# Patient Record
Sex: FEMALE | Race: WHITE | NOT HISPANIC OR LATINO | Employment: OTHER | URBAN - METROPOLITAN AREA
[De-identification: names, ages, dates, MRNs, and addresses within clinical notes are randomized per-mention and may not be internally consistent; named-entity substitution may affect disease eponyms.]

---

## 2024-07-31 ENCOUNTER — APPOINTMENT (OUTPATIENT)
Dept: RADIOLOGY | Facility: HOSPITAL | Age: 64
End: 2024-07-31
Payer: COMMERCIAL

## 2024-07-31 ENCOUNTER — APPOINTMENT (OUTPATIENT)
Dept: CARDIOLOGY | Facility: HOSPITAL | Age: 64
End: 2024-07-31
Payer: COMMERCIAL

## 2024-07-31 ENCOUNTER — HOSPITAL ENCOUNTER (OUTPATIENT)
Facility: HOSPITAL | Age: 64
Setting detail: OBSERVATION
Discharge: HOME | End: 2024-08-02
Attending: INTERNAL MEDICINE | Admitting: INTERNAL MEDICINE
Payer: COMMERCIAL

## 2024-07-31 DIAGNOSIS — R94.31 ABNORMAL ELECTROCARDIOGRAM (ECG) (EKG): ICD-10-CM

## 2024-07-31 DIAGNOSIS — S93.401A SPRAIN OF RIGHT ANKLE, UNSPECIFIED LIGAMENT, INITIAL ENCOUNTER: ICD-10-CM

## 2024-07-31 DIAGNOSIS — R55 SYNCOPE, UNSPECIFIED SYNCOPE TYPE: Primary | ICD-10-CM

## 2024-07-31 DIAGNOSIS — R00.1 BRADYCARDIA, UNSPECIFIED: ICD-10-CM

## 2024-07-31 LAB
ALBUMIN SERPL BCP-MCNC: 4.1 G/DL (ref 3.4–5)
ALP SERPL-CCNC: 61 U/L (ref 33–136)
ALT SERPL W P-5'-P-CCNC: 8 U/L (ref 7–45)
ANION GAP SERPL CALC-SCNC: 16 MMOL/L (ref 10–20)
AST SERPL W P-5'-P-CCNC: 21 U/L (ref 9–39)
BASOPHILS # BLD AUTO: 0.1 X10*3/UL (ref 0–0.1)
BASOPHILS NFR BLD AUTO: 0.6 %
BILIRUB SERPL-MCNC: 0.3 MG/DL (ref 0–1.2)
BUN SERPL-MCNC: 25 MG/DL (ref 6–23)
CALCIUM SERPL-MCNC: 8.8 MG/DL (ref 8.6–10.3)
CARDIAC TROPONIN I PNL SERPL HS: 6 NG/L (ref 0–13)
CHLORIDE SERPL-SCNC: 100 MMOL/L (ref 98–107)
CO2 SERPL-SCNC: 21 MMOL/L (ref 21–32)
CREAT SERPL-MCNC: 1.02 MG/DL (ref 0.5–1.05)
EGFRCR SERPLBLD CKD-EPI 2021: 61 ML/MIN/1.73M*2
EOSINOPHIL # BLD AUTO: 0.19 X10*3/UL (ref 0–0.7)
EOSINOPHIL NFR BLD AUTO: 1.2 %
ERYTHROCYTE [DISTWIDTH] IN BLOOD BY AUTOMATED COUNT: 12.9 % (ref 11.5–14.5)
GLUCOSE SERPL-MCNC: 83 MG/DL (ref 74–99)
HCT VFR BLD AUTO: 41.7 % (ref 36–46)
HGB BLD-MCNC: 14.3 G/DL (ref 12–16)
HOLD SPECIMEN: NORMAL
IMM GRANULOCYTES # BLD AUTO: 0.04 X10*3/UL (ref 0–0.7)
IMM GRANULOCYTES NFR BLD AUTO: 0.3 % (ref 0–0.9)
LYMPHOCYTES # BLD AUTO: 7.52 X10*3/UL (ref 1.2–4.8)
LYMPHOCYTES NFR BLD AUTO: 48.6 %
MAGNESIUM SERPL-MCNC: 2.23 MG/DL (ref 1.6–2.4)
MCH RBC QN AUTO: 32.9 PG (ref 26–34)
MCHC RBC AUTO-ENTMCNC: 34.3 G/DL (ref 32–36)
MCV RBC AUTO: 96 FL (ref 80–100)
MONOCYTES # BLD AUTO: 0.99 X10*3/UL (ref 0.1–1)
MONOCYTES NFR BLD AUTO: 6.4 %
NEUTROPHILS # BLD AUTO: 6.64 X10*3/UL (ref 1.2–7.7)
NEUTROPHILS NFR BLD AUTO: 42.9 %
NRBC BLD-RTO: 0 /100 WBCS (ref 0–0)
PLATELET # BLD AUTO: 219 X10*3/UL (ref 150–450)
POTASSIUM SERPL-SCNC: 4.1 MMOL/L (ref 3.5–5.3)
PROT SERPL-MCNC: 6.7 G/DL (ref 6.4–8.2)
RBC # BLD AUTO: 4.35 X10*6/UL (ref 4–5.2)
SODIUM SERPL-SCNC: 133 MMOL/L (ref 136–145)
WBC # BLD AUTO: 15.5 X10*3/UL (ref 4.4–11.3)

## 2024-07-31 PROCEDURE — 70450 CT HEAD/BRAIN W/O DYE: CPT | Performed by: RADIOLOGY

## 2024-07-31 PROCEDURE — 72125 CT NECK SPINE W/O DYE: CPT

## 2024-07-31 PROCEDURE — 71045 X-RAY EXAM CHEST 1 VIEW: CPT | Mod: FOREIGN READ | Performed by: RADIOLOGY

## 2024-07-31 PROCEDURE — 86140 C-REACTIVE PROTEIN: CPT | Performed by: INTERNAL MEDICINE

## 2024-07-31 PROCEDURE — 73610 X-RAY EXAM OF ANKLE: CPT | Mod: RIGHT SIDE | Performed by: RADIOLOGY

## 2024-07-31 PROCEDURE — 84484 ASSAY OF TROPONIN QUANT: CPT | Performed by: STUDENT IN AN ORGANIZED HEALTH CARE EDUCATION/TRAINING PROGRAM

## 2024-07-31 PROCEDURE — 85025 COMPLETE CBC W/AUTO DIFF WBC: CPT | Performed by: STUDENT IN AN ORGANIZED HEALTH CARE EDUCATION/TRAINING PROGRAM

## 2024-07-31 PROCEDURE — 73610 X-RAY EXAM OF ANKLE: CPT | Mod: RT

## 2024-07-31 PROCEDURE — 2500000001 HC RX 250 WO HCPCS SELF ADMINISTERED DRUGS (ALT 637 FOR MEDICARE OP): Performed by: STUDENT IN AN ORGANIZED HEALTH CARE EDUCATION/TRAINING PROGRAM

## 2024-07-31 PROCEDURE — 73590 X-RAY EXAM OF LOWER LEG: CPT | Mod: LEFT SIDE | Performed by: RADIOLOGY

## 2024-07-31 PROCEDURE — 83735 ASSAY OF MAGNESIUM: CPT | Performed by: STUDENT IN AN ORGANIZED HEALTH CARE EDUCATION/TRAINING PROGRAM

## 2024-07-31 PROCEDURE — 71045 X-RAY EXAM CHEST 1 VIEW: CPT

## 2024-07-31 PROCEDURE — 80053 COMPREHEN METABOLIC PANEL: CPT | Performed by: STUDENT IN AN ORGANIZED HEALTH CARE EDUCATION/TRAINING PROGRAM

## 2024-07-31 PROCEDURE — 70450 CT HEAD/BRAIN W/O DYE: CPT

## 2024-07-31 PROCEDURE — 93005 ELECTROCARDIOGRAM TRACING: CPT

## 2024-07-31 PROCEDURE — 36415 COLL VENOUS BLD VENIPUNCTURE: CPT | Performed by: STUDENT IN AN ORGANIZED HEALTH CARE EDUCATION/TRAINING PROGRAM

## 2024-07-31 PROCEDURE — 73590 X-RAY EXAM OF LOWER LEG: CPT | Mod: LT

## 2024-07-31 PROCEDURE — 99285 EMERGENCY DEPT VISIT HI MDM: CPT

## 2024-07-31 PROCEDURE — 72125 CT NECK SPINE W/O DYE: CPT | Performed by: RADIOLOGY

## 2024-07-31 PROCEDURE — 82550 ASSAY OF CK (CPK): CPT | Performed by: INTERNAL MEDICINE

## 2024-07-31 RX ORDER — OXYCODONE HYDROCHLORIDE 5 MG/1
5 TABLET ORAL ONCE
Status: COMPLETED | OUTPATIENT
Start: 2024-07-31 | End: 2024-07-31

## 2024-07-31 ASSESSMENT — COLUMBIA-SUICIDE SEVERITY RATING SCALE - C-SSRS
1. IN THE PAST MONTH, HAVE YOU WISHED YOU WERE DEAD OR WISHED YOU COULD GO TO SLEEP AND NOT WAKE UP?: NO
2. HAVE YOU ACTUALLY HAD ANY THOUGHTS OF KILLING YOURSELF?: NO
6. HAVE YOU EVER DONE ANYTHING, STARTED TO DO ANYTHING, OR PREPARED TO DO ANYTHING TO END YOUR LIFE?: NO

## 2024-08-01 ENCOUNTER — APPOINTMENT (OUTPATIENT)
Dept: CARDIOLOGY | Facility: HOSPITAL | Age: 64
End: 2024-08-01
Payer: COMMERCIAL

## 2024-08-01 ENCOUNTER — APPOINTMENT (OUTPATIENT)
Dept: RADIOLOGY | Facility: HOSPITAL | Age: 64
End: 2024-08-01
Payer: COMMERCIAL

## 2024-08-01 PROBLEM — R55 SYNCOPE, UNSPECIFIED SYNCOPE TYPE: Status: ACTIVE | Noted: 2024-08-01

## 2024-08-01 LAB
ANION GAP SERPL CALC-SCNC: 11 MMOL/L (ref 10–20)
AORTIC VALVE MEAN GRADIENT: 9 MMHG
AORTIC VALVE PEAK VELOCITY: 2.09 M/S
ATRIAL RATE: 49 BPM
ATRIAL RATE: 50 BPM
AV PEAK GRADIENT: 17.5 MMHG
AVA (PEAK VEL): 2.34 CM2
AVA (VTI): 2.37 CM2
BUN SERPL-MCNC: 24 MG/DL (ref 6–23)
CALCIUM SERPL-MCNC: 8.6 MG/DL (ref 8.6–10.3)
CARDIAC TROPONIN I PNL SERPL HS: 5 NG/L (ref 0–13)
CHLORIDE SERPL-SCNC: 103 MMOL/L (ref 98–107)
CK SERPL-CCNC: 47 U/L (ref 0–215)
CO2 SERPL-SCNC: 27 MMOL/L (ref 21–32)
CREAT SERPL-MCNC: 0.81 MG/DL (ref 0.5–1.05)
CRP SERPL-MCNC: 0.23 MG/DL
EGFRCR SERPLBLD CKD-EPI 2021: 80 ML/MIN/1.73M*2
EJECTION FRACTION APICAL 4 CHAMBER: 60.8
EJECTION FRACTION: 67 %
ERYTHROCYTE [DISTWIDTH] IN BLOOD BY AUTOMATED COUNT: 13 % (ref 11.5–14.5)
GLUCOSE SERPL-MCNC: 95 MG/DL (ref 74–99)
HCT VFR BLD AUTO: 39.8 % (ref 36–46)
HGB BLD-MCNC: 13.3 G/DL (ref 12–16)
LEFT ATRIUM VOLUME AREA LENGTH INDEX BSA: 28.3 ML/M2
LEFT VENTRICLE INTERNAL DIMENSION DIASTOLE: 4.9 CM (ref 3.5–6)
LEFT VENTRICULAR OUTFLOW TRACT DIAMETER: 2.1 CM
MCH RBC QN AUTO: 32.2 PG (ref 26–34)
MCHC RBC AUTO-ENTMCNC: 33.4 G/DL (ref 32–36)
MCV RBC AUTO: 96 FL (ref 80–100)
MITRAL VALVE E/A RATIO: 1.06
NRBC BLD-RTO: 0 /100 WBCS (ref 0–0)
P AXIS: 69 DEGREES
P AXIS: 77 DEGREES
P OFFSET: 198 MS
P OFFSET: 200 MS
P ONSET: 143 MS
P ONSET: 143 MS
PLATELET # BLD AUTO: 206 X10*3/UL (ref 150–450)
POTASSIUM SERPL-SCNC: 3.1 MMOL/L (ref 3.5–5.3)
PR INTERVAL: 160 MS
PR INTERVAL: 160 MS
Q ONSET: 223 MS
Q ONSET: 223 MS
QRS COUNT: 8 BEATS
QRS COUNT: 8 BEATS
QRS DURATION: 86 MS
QRS DURATION: 90 MS
QT INTERVAL: 478 MS
QT INTERVAL: 502 MS
QTC CALCULATION(BAZETT): 431 MS
QTC CALCULATION(BAZETT): 457 MS
QTC FREDERICIA: 446 MS
QTC FREDERICIA: 472 MS
R AXIS: 80 DEGREES
R AXIS: 80 DEGREES
RBC # BLD AUTO: 4.13 X10*6/UL (ref 4–5.2)
RIGHT VENTRICLE FREE WALL PEAK S': 15.8 CM/S
RIGHT VENTRICLE PEAK SYSTOLIC PRESSURE: 16.5 MMHG
SODIUM SERPL-SCNC: 138 MMOL/L (ref 136–145)
T AXIS: 73 DEGREES
T AXIS: 73 DEGREES
T OFFSET: 462 MS
T OFFSET: 474 MS
TRICUSPID ANNULAR PLANE SYSTOLIC EXCURSION: 2.5 CM
VENTRICULAR RATE: 49 BPM
VENTRICULAR RATE: 50 BPM
WBC # BLD AUTO: 14 X10*3/UL (ref 4.4–11.3)

## 2024-08-01 PROCEDURE — 2500000004 HC RX 250 GENERAL PHARMACY W/ HCPCS (ALT 636 FOR OP/ED): Performed by: INTERNAL MEDICINE

## 2024-08-01 PROCEDURE — 36415 COLL VENOUS BLD VENIPUNCTURE: CPT | Performed by: INTERNAL MEDICINE

## 2024-08-01 PROCEDURE — G0378 HOSPITAL OBSERVATION PER HR: HCPCS

## 2024-08-01 PROCEDURE — 97165 OT EVAL LOW COMPLEX 30 MIN: CPT | Mod: GO

## 2024-08-01 PROCEDURE — 85027 COMPLETE CBC AUTOMATED: CPT | Performed by: INTERNAL MEDICINE

## 2024-08-01 PROCEDURE — 80048 BASIC METABOLIC PNL TOTAL CA: CPT | Performed by: INTERNAL MEDICINE

## 2024-08-01 PROCEDURE — 96374 THER/PROPH/DIAG INJ IV PUSH: CPT | Mod: 59

## 2024-08-01 PROCEDURE — 2500000001 HC RX 250 WO HCPCS SELF ADMINISTERED DRUGS (ALT 637 FOR MEDICARE OP): Performed by: INTERNAL MEDICINE

## 2024-08-01 PROCEDURE — 2500000001 HC RX 250 WO HCPCS SELF ADMINISTERED DRUGS (ALT 637 FOR MEDICARE OP): Performed by: STUDENT IN AN ORGANIZED HEALTH CARE EDUCATION/TRAINING PROGRAM

## 2024-08-01 PROCEDURE — 99223 1ST HOSP IP/OBS HIGH 75: CPT | Performed by: INTERNAL MEDICINE

## 2024-08-01 PROCEDURE — 93306 TTE W/DOPPLER COMPLETE: CPT | Performed by: INTERNAL MEDICINE

## 2024-08-01 PROCEDURE — 97161 PT EVAL LOW COMPLEX 20 MIN: CPT | Mod: GP

## 2024-08-01 PROCEDURE — 94640 AIRWAY INHALATION TREATMENT: CPT

## 2024-08-01 PROCEDURE — 93971 EXTREMITY STUDY: CPT

## 2024-08-01 PROCEDURE — 96372 THER/PROPH/DIAG INJ SC/IM: CPT | Mod: 59 | Performed by: INTERNAL MEDICINE

## 2024-08-01 PROCEDURE — C9113 INJ PANTOPRAZOLE SODIUM, VIA: HCPCS | Performed by: INTERNAL MEDICINE

## 2024-08-01 PROCEDURE — 93306 TTE W/DOPPLER COMPLETE: CPT

## 2024-08-01 PROCEDURE — 93971 EXTREMITY STUDY: CPT | Performed by: STUDENT IN AN ORGANIZED HEALTH CARE EDUCATION/TRAINING PROGRAM

## 2024-08-01 PROCEDURE — 2500000002 HC RX 250 W HCPCS SELF ADMINISTERED DRUGS (ALT 637 FOR MEDICARE OP, ALT 636 FOR OP/ED): Performed by: INTERNAL MEDICINE

## 2024-08-01 RX ORDER — ACETAMINOPHEN 325 MG/1
650 TABLET ORAL EVERY 4 HOURS PRN
Status: DISCONTINUED | OUTPATIENT
Start: 2024-08-01 | End: 2024-08-02 | Stop reason: HOSPADM

## 2024-08-01 RX ORDER — PREGABALIN 75 MG/1
150 CAPSULE ORAL 2 TIMES DAILY
Status: DISCONTINUED | OUTPATIENT
Start: 2024-08-01 | End: 2024-08-02 | Stop reason: HOSPADM

## 2024-08-01 RX ORDER — PANTOPRAZOLE SODIUM 40 MG/1
40 TABLET, DELAYED RELEASE ORAL DAILY
Status: DISCONTINUED | OUTPATIENT
Start: 2024-08-01 | End: 2024-08-02 | Stop reason: HOSPADM

## 2024-08-01 RX ORDER — OXYCODONE HYDROCHLORIDE 5 MG/1
5 TABLET ORAL EVERY 6 HOURS PRN
Status: DISCONTINUED | OUTPATIENT
Start: 2024-08-01 | End: 2024-08-02 | Stop reason: HOSPADM

## 2024-08-01 RX ORDER — ACETAMINOPHEN 650 MG/1
650 SUPPOSITORY RECTAL EVERY 4 HOURS PRN
Status: DISCONTINUED | OUTPATIENT
Start: 2024-08-01 | End: 2024-08-02 | Stop reason: HOSPADM

## 2024-08-01 RX ORDER — TIOTROPIUM BROMIDE 18 UG/1
18 CAPSULE ORAL; RESPIRATORY (INHALATION)
COMMUNITY

## 2024-08-01 RX ORDER — POTASSIUM CHLORIDE 20 MEQ/1
20 TABLET, EXTENDED RELEASE ORAL ONCE
Status: COMPLETED | OUTPATIENT
Start: 2024-08-01 | End: 2024-08-01

## 2024-08-01 RX ORDER — LOSARTAN POTASSIUM AND HYDROCHLOROTHIAZIDE 12.5; 5 MG/1; MG/1
1 TABLET ORAL DAILY
COMMUNITY
End: 2024-08-01 | Stop reason: WASHOUT

## 2024-08-01 RX ORDER — LAMOTRIGINE 100 MG/1
250 TABLET ORAL 2 TIMES DAILY
Status: DISCONTINUED | OUTPATIENT
Start: 2024-08-01 | End: 2024-08-02 | Stop reason: HOSPADM

## 2024-08-01 RX ORDER — LAMOTRIGINE 100 MG/1
250 TABLET ORAL 2 TIMES DAILY
Status: DISCONTINUED | OUTPATIENT
Start: 2024-08-01 | End: 2024-08-01

## 2024-08-01 RX ORDER — IPRATROPIUM BROMIDE AND ALBUTEROL SULFATE 2.5; .5 MG/3ML; MG/3ML
3 SOLUTION RESPIRATORY (INHALATION) EVERY 2 HOUR PRN
Status: DISCONTINUED | OUTPATIENT
Start: 2024-08-01 | End: 2024-08-02 | Stop reason: HOSPADM

## 2024-08-01 RX ORDER — ALBUTEROL SULFATE 90 UG/1
2 INHALANT RESPIRATORY (INHALATION) EVERY 4 HOURS PRN
COMMUNITY

## 2024-08-01 RX ORDER — ROSUVASTATIN CALCIUM 10 MG/1
20 TABLET, COATED ORAL DAILY
Status: DISCONTINUED | OUTPATIENT
Start: 2024-08-01 | End: 2024-08-02 | Stop reason: HOSPADM

## 2024-08-01 RX ORDER — FLUTICASONE FUROATE AND VILANTEROL 100; 25 UG/1; UG/1
1 POWDER RESPIRATORY (INHALATION)
Status: DISCONTINUED | OUTPATIENT
Start: 2024-08-01 | End: 2024-08-02 | Stop reason: HOSPADM

## 2024-08-01 RX ORDER — BUSPIRONE HYDROCHLORIDE 30 MG/1
30 TABLET ORAL 2 TIMES DAILY
COMMUNITY

## 2024-08-01 RX ORDER — ACETAMINOPHEN 160 MG/5ML
650 SOLUTION ORAL EVERY 4 HOURS PRN
Status: DISCONTINUED | OUTPATIENT
Start: 2024-08-01 | End: 2024-08-02 | Stop reason: HOSPADM

## 2024-08-01 RX ORDER — ENOXAPARIN SODIUM 100 MG/ML
40 INJECTION SUBCUTANEOUS EVERY 24 HOURS
Status: DISCONTINUED | OUTPATIENT
Start: 2024-08-01 | End: 2024-08-02 | Stop reason: HOSPADM

## 2024-08-01 RX ORDER — HYDROXYZINE PAMOATE 25 MG/1
25 CAPSULE ORAL 4 TIMES DAILY PRN
Status: DISCONTINUED | OUTPATIENT
Start: 2024-08-01 | End: 2024-08-02 | Stop reason: HOSPADM

## 2024-08-01 RX ORDER — CETIRIZINE HYDROCHLORIDE 10 MG/1
10 TABLET ORAL DAILY
Status: DISCONTINUED | OUTPATIENT
Start: 2024-08-01 | End: 2024-08-02 | Stop reason: HOSPADM

## 2024-08-01 RX ORDER — LAMOTRIGINE 200 MG/1
250 TABLET ORAL EVERY EVENING
COMMUNITY
End: 2024-08-01 | Stop reason: ENTERED-IN-ERROR

## 2024-08-01 RX ORDER — AMOXICILLIN 250 MG
2 CAPSULE ORAL 2 TIMES DAILY
Status: DISCONTINUED | OUTPATIENT
Start: 2024-08-01 | End: 2024-08-02 | Stop reason: HOSPADM

## 2024-08-01 RX ORDER — HYDROXYZINE HYDROCHLORIDE 25 MG/1
1 TABLET, FILM COATED ORAL 2 TIMES DAILY PRN
COMMUNITY

## 2024-08-01 RX ORDER — HYDROCHLOROTHIAZIDE 25 MG/1
25 TABLET ORAL DAILY
COMMUNITY

## 2024-08-01 RX ORDER — ZONISAMIDE 100 MG/1
300 CAPSULE ORAL NIGHTLY
COMMUNITY

## 2024-08-01 RX ORDER — LOSARTAN POTASSIUM 25 MG/1
25 TABLET ORAL DAILY
COMMUNITY

## 2024-08-01 RX ORDER — OXYCODONE HYDROCHLORIDE 5 MG/1
5 TABLET ORAL EVERY 8 HOURS PRN
Qty: 9 TABLET | Refills: 0 | Status: SHIPPED | OUTPATIENT
Start: 2024-08-01 | End: 2024-08-02

## 2024-08-01 RX ORDER — FLUOXETINE HYDROCHLORIDE 20 MG/1
40 CAPSULE ORAL DAILY
Status: DISCONTINUED | OUTPATIENT
Start: 2024-08-01 | End: 2024-08-02 | Stop reason: HOSPADM

## 2024-08-01 RX ORDER — IPRATROPIUM BROMIDE 17 UG/1
2 AEROSOL, METERED RESPIRATORY (INHALATION) 4 TIMES DAILY PRN
COMMUNITY
End: 2024-08-01 | Stop reason: ENTERED-IN-ERROR

## 2024-08-01 RX ORDER — PREGABALIN 75 MG/1
150 CAPSULE ORAL 2 TIMES DAILY
Status: DISCONTINUED | OUTPATIENT
Start: 2024-08-01 | End: 2024-08-01

## 2024-08-01 RX ORDER — LANOLIN ALCOHOL/MO/W.PET/CERES
1000 CREAM (GRAM) TOPICAL DAILY
COMMUNITY

## 2024-08-01 RX ORDER — LAMOTRIGINE 200 MG/1
250 TABLET ORAL EVERY MORNING
COMMUNITY
End: 2024-08-01 | Stop reason: ENTERED-IN-ERROR

## 2024-08-01 RX ORDER — PANTOPRAZOLE SODIUM 40 MG/10ML
40 INJECTION, POWDER, LYOPHILIZED, FOR SOLUTION INTRAVENOUS DAILY
Status: DISCONTINUED | OUTPATIENT
Start: 2024-08-01 | End: 2024-08-02 | Stop reason: HOSPADM

## 2024-08-01 RX ORDER — MORPHINE SULFATE 4 MG/ML
4 INJECTION, SOLUTION INTRAMUSCULAR; INTRAVENOUS EVERY 4 HOURS PRN
Status: DISCONTINUED | OUTPATIENT
Start: 2024-08-01 | End: 2024-08-02 | Stop reason: HOSPADM

## 2024-08-01 RX ORDER — FLUTICASONE PROPIONATE 50 MCG
1 SPRAY, SUSPENSION (ML) NASAL DAILY
Status: DISCONTINUED | OUTPATIENT
Start: 2024-08-01 | End: 2024-08-02 | Stop reason: HOSPADM

## 2024-08-01 RX ORDER — PREGABALIN 150 MG/1
150 CAPSULE ORAL EVERY MORNING
COMMUNITY

## 2024-08-01 RX ORDER — CALCIUM CARBONATE/VITAMIN D3 250-3.125
1 TABLET ORAL
COMMUNITY

## 2024-08-01 RX ORDER — FERROUS SULFATE, DRIED 160(50) MG
0.5 TABLET, EXTENDED RELEASE ORAL
Status: DISCONTINUED | OUTPATIENT
Start: 2024-08-01 | End: 2024-08-02 | Stop reason: HOSPADM

## 2024-08-01 RX ORDER — ROSUVASTATIN CALCIUM 20 MG/1
20 TABLET, COATED ORAL DAILY
COMMUNITY

## 2024-08-01 RX ORDER — PREGABALIN 75 MG/1
150 CAPSULE ORAL EVERY EVENING
Status: DISCONTINUED | OUTPATIENT
Start: 2024-08-01 | End: 2024-08-01

## 2024-08-01 RX ORDER — ZONISAMIDE 100 MG/1
300 CAPSULE ORAL NIGHTLY
Status: DISCONTINUED | OUTPATIENT
Start: 2024-08-01 | End: 2024-08-02 | Stop reason: HOSPADM

## 2024-08-01 RX ORDER — LORATADINE 10 MG/1
10 TABLET ORAL DAILY
COMMUNITY

## 2024-08-01 RX ORDER — IPRATROPIUM BROMIDE AND ALBUTEROL SULFATE 2.5; .5 MG/3ML; MG/3ML
3 SOLUTION RESPIRATORY (INHALATION) EVERY 4 HOURS PRN
Status: DISCONTINUED | OUTPATIENT
Start: 2024-08-01 | End: 2024-08-01

## 2024-08-01 RX ORDER — BUSPIRONE HYDROCHLORIDE 10 MG/1
30 TABLET ORAL 2 TIMES DAILY
Status: DISCONTINUED | OUTPATIENT
Start: 2024-08-01 | End: 2024-08-02 | Stop reason: HOSPADM

## 2024-08-01 RX ORDER — HYDROXYZINE PAMOATE 25 MG/1
25 CAPSULE ORAL 4 TIMES DAILY PRN
COMMUNITY
End: 2024-08-01 | Stop reason: ENTERED-IN-ERROR

## 2024-08-01 RX ORDER — OMEPRAZOLE 20 MG/1
20 CAPSULE, DELAYED RELEASE ORAL
COMMUNITY

## 2024-08-01 RX ORDER — ASPIRIN 81 MG/1
81 TABLET ORAL DAILY
COMMUNITY

## 2024-08-01 RX ORDER — FLUTICASONE PROPIONATE 50 MCG
1 SPRAY, SUSPENSION (ML) NASAL DAILY
COMMUNITY
End: 2024-08-01 | Stop reason: ENTERED-IN-ERROR

## 2024-08-01 RX ORDER — FERROUS SULFATE 325(65) MG
65 TABLET, DELAYED RELEASE (ENTERIC COATED) ORAL
COMMUNITY

## 2024-08-01 RX ORDER — DICLOFENAC SODIUM 10 MG/G
4 GEL TOPICAL 4 TIMES DAILY PRN
COMMUNITY
End: 2024-08-01 | Stop reason: ENTERED-IN-ERROR

## 2024-08-01 RX ORDER — LAMOTRIGINE 100 MG/1
250 TABLET ORAL EVERY MORNING
Status: DISCONTINUED | OUTPATIENT
Start: 2024-08-01 | End: 2024-08-01

## 2024-08-01 RX ORDER — BUDESONIDE AND FORMOTEROL FUMARATE DIHYDRATE 80; 4.5 UG/1; UG/1
2 AEROSOL RESPIRATORY (INHALATION)
COMMUNITY

## 2024-08-01 RX ORDER — LOSARTAN POTASSIUM 25 MG/1
25 TABLET ORAL DAILY
Status: DISCONTINUED | OUTPATIENT
Start: 2024-08-01 | End: 2024-08-02 | Stop reason: HOSPADM

## 2024-08-01 RX ORDER — FLUOXETINE HYDROCHLORIDE 20 MG/1
40 CAPSULE ORAL DAILY
COMMUNITY

## 2024-08-01 RX ORDER — PREGABALIN 150 MG/1
300 CAPSULE ORAL EVERY EVENING
COMMUNITY

## 2024-08-01 RX ORDER — LAMOTRIGINE 100 MG/1
250 TABLET ORAL 2 TIMES DAILY
COMMUNITY

## 2024-08-01 RX ORDER — HYDROCHLOROTHIAZIDE 25 MG/1
25 TABLET ORAL DAILY
Status: DISCONTINUED | OUTPATIENT
Start: 2024-08-01 | End: 2024-08-02 | Stop reason: HOSPADM

## 2024-08-01 RX ORDER — LANOLIN ALCOHOL/MO/W.PET/CERES
1000 CREAM (GRAM) TOPICAL DAILY
Status: DISCONTINUED | OUTPATIENT
Start: 2024-08-01 | End: 2024-08-02 | Stop reason: HOSPADM

## 2024-08-01 SDOH — SOCIAL STABILITY: SOCIAL INSECURITY: ARE THERE ANY APPARENT SIGNS OF INJURIES/BEHAVIORS THAT COULD BE RELATED TO ABUSE/NEGLECT?: NO

## 2024-08-01 SDOH — SOCIAL STABILITY: SOCIAL INSECURITY: DO YOU FEEL ANYONE HAS EXPLOITED OR TAKEN ADVANTAGE OF YOU FINANCIALLY OR OF YOUR PERSONAL PROPERTY?: NO

## 2024-08-01 SDOH — SOCIAL STABILITY: SOCIAL INSECURITY: WERE YOU ABLE TO COMPLETE ALL THE BEHAVIORAL HEALTH SCREENINGS?: YES

## 2024-08-01 SDOH — SOCIAL STABILITY: SOCIAL INSECURITY: DO YOU FEEL UNSAFE GOING BACK TO THE PLACE WHERE YOU ARE LIVING?: NO

## 2024-08-01 SDOH — SOCIAL STABILITY: SOCIAL INSECURITY: ABUSE: ADULT

## 2024-08-01 SDOH — SOCIAL STABILITY: SOCIAL INSECURITY: ARE YOU OR HAVE YOU BEEN THREATENED OR ABUSED PHYSICALLY, EMOTIONALLY, OR SEXUALLY BY ANYONE?: NO

## 2024-08-01 SDOH — SOCIAL STABILITY: SOCIAL INSECURITY: HAVE YOU HAD THOUGHTS OF HARMING ANYONE ELSE?: NO

## 2024-08-01 SDOH — SOCIAL STABILITY: SOCIAL INSECURITY: HAS ANYONE EVER THREATENED TO HURT YOUR FAMILY OR YOUR PETS?: NO

## 2024-08-01 SDOH — SOCIAL STABILITY: SOCIAL INSECURITY: HAVE YOU HAD ANY THOUGHTS OF HARMING ANYONE ELSE?: NO

## 2024-08-01 SDOH — SOCIAL STABILITY: SOCIAL INSECURITY: DOES ANYONE TRY TO KEEP YOU FROM HAVING/CONTACTING OTHER FRIENDS OR DOING THINGS OUTSIDE YOUR HOME?: NO

## 2024-08-01 ASSESSMENT — ACTIVITIES OF DAILY LIVING (ADL)
DRESSING YOURSELF: INDEPENDENT
LACK_OF_TRANSPORTATION: NO
WALKS IN HOME: INDEPENDENT
GROOMING: INDEPENDENT
ADEQUATE_TO_COMPLETE_ADL: YES
BATHING: INDEPENDENT
HEARING - RIGHT EAR: FUNCTIONAL
TOILETING: INDEPENDENT
FEEDING YOURSELF: INDEPENDENT
PATIENT'S MEMORY ADEQUATE TO SAFELY COMPLETE DAILY ACTIVITIES?: YES
JUDGMENT_ADEQUATE_SAFELY_COMPLETE_DAILY_ACTIVITIES: YES
HEARING - LEFT EAR: FUNCTIONAL
ASSISTIVE_DEVICE: EYEGLASSES

## 2024-08-01 ASSESSMENT — COGNITIVE AND FUNCTIONAL STATUS - GENERAL
WALKING IN HOSPITAL ROOM: A LOT
WALKING IN HOSPITAL ROOM: A LITTLE
MOBILITY SCORE: 14
DAILY ACTIVITIY SCORE: 22
CLIMB 3 TO 5 STEPS WITH RAILING: A LOT
DRESSING REGULAR LOWER BODY CLOTHING: A LITTLE
MOVING TO AND FROM BED TO CHAIR: A LITTLE
DRESSING REGULAR LOWER BODY CLOTHING: TOTAL
MOVING TO AND FROM BED TO CHAIR: A LOT
TOILETING: A LOT
STANDING UP FROM CHAIR USING ARMS: A LITTLE
TOILETING: A LITTLE
CLIMB 3 TO 5 STEPS WITH RAILING: A LOT
STANDING UP FROM CHAIR USING ARMS: A LOT
PATIENT BASELINE BEDBOUND: NO
TURNING FROM BACK TO SIDE WHILE IN FLAT BAD: A LITTLE
DAILY ACTIVITIY SCORE: 15
HELP NEEDED FOR BATHING: TOTAL
DRESSING REGULAR UPPER BODY CLOTHING: A LITTLE
MOBILITY SCORE: 19
MOVING FROM LYING ON BACK TO SITTING ON SIDE OF FLAT BED WITH BEDRAILS: A LITTLE

## 2024-08-01 ASSESSMENT — LIFESTYLE VARIABLES
HOW MANY STANDARD DRINKS CONTAINING ALCOHOL DO YOU HAVE ON A TYPICAL DAY: 1 OR 2
SKIP TO QUESTIONS 9-10: 1
HOW OFTEN DO YOU HAVE 6 OR MORE DRINKS ON ONE OCCASION: NEVER
HOW OFTEN DO YOU HAVE A DRINK CONTAINING ALCOHOL: MONTHLY OR LESS
AUDIT-C TOTAL SCORE: 1
AUDIT-C TOTAL SCORE: 1

## 2024-08-01 ASSESSMENT — PAIN SCALES - GENERAL
PAINLEVEL_OUTOF10: 9
PAINLEVEL_OUTOF10: 7

## 2024-08-01 ASSESSMENT — PATIENT HEALTH QUESTIONNAIRE - PHQ9
2. FEELING DOWN, DEPRESSED OR HOPELESS: NOT AT ALL
1. LITTLE INTEREST OR PLEASURE IN DOING THINGS: NOT AT ALL
SUM OF ALL RESPONSES TO PHQ9 QUESTIONS 1 & 2: 0

## 2024-08-01 ASSESSMENT — PAIN DESCRIPTION - ORIENTATION
ORIENTATION: RIGHT;LEFT
ORIENTATION: RIGHT;LEFT

## 2024-08-01 ASSESSMENT — PAIN DESCRIPTION - LOCATION
LOCATION: ANKLE
LOCATION: ANKLE

## 2024-08-01 NOTE — PROGRESS NOTES
Physical Therapy    Physical Therapy Evaluation    Patient Name: Elana Farr  MRN: [id number]  Today's Date: 8/1/2024   Time Calculation  Start Time: 1040  Stop Time: 1056  Time Calculation (min): 16 min  AC23/AC23    Assessment/Plan   PT Assessment  PT Assessment Results: Decreased endurance, Impaired balance, Decreased mobility, Pain  End of Session Communication: Bedside nurse  End of Session Patient Position: Bed, 2 rail up, On cart, Alarm off, not on at start of session (All needs in reach, RN present)  IP OR SWING BED PT PLAN  Inpatient or Swing Bed: Inpatient  PT Plan  Treatment/Interventions: Bed mobility, Transfer training, Gait training  PT Plan: Ongoing PT  PT Frequency: 3 times per week  PT Discharge Recommendations: Moderate intensity level of continued care  PT - OK to Discharge: Yes    Subjective     Current Problem:  1. Syncope, unspecified syncope type  Transthoracic Echo (TTE) Complete    Transthoracic Echo (TTE) Complete      2. Sprain of right ankle, unspecified ligament, initial encounter  oxyCODONE (Roxicodone) 5 mg immediate release tablet      3. Abnormal electrocardiogram (ECG) (EKG)  Transthoracic Echo (TTE) Complete      4. Bradycardia, unspecified  Transthoracic Echo (TTE) Complete        Patient Active Problem List   Diagnosis    Syncope, unspecified syncope type     General Visit Information:  General  Reason for Referral: PT Eval and Treat  Referred By: Darion Daniel DO  Past Medical History Relevant to Rehab: 64 y.o. female with past medical history of hypertension, hyperlipidemia moderate recurrent major depression disorder, anxiety, obstructive sleep apnea, asthma, COPD, seizure disorder, who is presenting to [facility] ED with syncopal episode x 1 at home.  Patient states she was in her backyard feeding birds, unsure how long she been out there but denies being under the sun, and after getting up and heading back to the house, she experienced episode of  "dizziness, and shortly after blacked out.  States that her partner witnessed the event, and saw her falling to her right side with her head protecting her head, and denied any head impact.  Stated that she was not able to get up helped to stand up she was not able to bear weight on her feet, with pain on lower extremity bilaterally but worse on the left foot, with shooting pain on the lateral aspect of the foot radiating up to the leg.  Patient has a history of seizures, but denies going through seizure episode, or having any other symptoms at this time.  Prior to Session Communication: Bedside nurse  Patient Position Received: Bed, 2 rail up, On cart, Alarm off, not on at start of session (Pt on ED cart, agreeable to PT)    Home Living:  Home Living  Home Living Comments: Pt lives with her partner in a 2 story house with 4 TYLER with HR and 1st floor set up with laundry in the basement with HR. Bathroom has a tub/shower with a transfer bench, grab bar, and HHSH, and a standard toilet. (Pt's partner has MS.)    Prior Level of Function:  Prior Function Per Pt/Caregiver Report  Level of [place]: Independent with ADLs and functional transfers, Independent with homemaking with ambulation (Pt amb without an AD, owns rollator, SPC, and possibly a RW, shares IADLs with partner, (-) driving, partner drives)    Precautions:  Precautions  Precautions Comment: Fall precautions, R ankle aircast    Vital Signs:  Vital Signs  BP:  (VSS)    Objective     Pain:  Pain Assessment  Pain Assessment:  (Pt reports tolerable pain at rest, however states B LE pain is \"25/10\" with activity. Pt states the L ankle pain radiates up through her calf and the R ankle is more localized pain.)    Cognition:  Cognition  Overall Cognitive Status: Within Functional Limits    General Assessments:  Sensation  Light Touch: No apparent deficits  Strength  Strength Comments: B LE ROM WFL, B LE strength at least 3+/5  Dynamic Standing Balance  Dynamic " Standing-Comments: Poor with use of RW and mod A x 2    Functional Assessments:  Bed Mobility  Bed Mobility:  (supine to sitting: min A x 1, sit to supine: mod A x 2 due to B LE pain)  Transfers  Transfer:  (STS from ED cart: min A x 2 with B LE instability noted)  Ambulation/Gait Training  Ambulation/Gait Training Performed:  (Pt was only able to side step x 2' using RW with mod A x 2 requiring increased B UE support through RW, increased difficulty WBing and advancing B LEs, and pt becoming lightheaded as well. Pt returned to sitting and then supine with VSS.)    Outcome Measures:   [facility] Basic Mobility  Turning from your back to your side while in a flat bed without using bedrails: A little  Moving from lying on your back to sitting on the side of a flat bed without using bedrails: A little  Moving to and from bed to chair (including a wheelchair): A lot  Standing up from a chair using your arms (e.g. wheelchair or bedside chair): A lot  To walk in hospital room: A lot  Climbing 3-5 steps with railing: A lot  Basic Mobility - Total Score: 14     Goals:  Encounter Problems       Encounter Problems (Active)       PT Problem       STG - Pt will transition supine <> sitting with SUP  (Progressing)       Start:  08/01/24    Expected End:  08/16/24            STG - Pt will transfer STS with SBA  (Progressing)       Start:  08/01/24    Expected End:  08/16/24            STG - Pt will amb 50' using LRD with SBA  (Progressing)       Start:  08/01/24    Expected End:  08/16/24                 Education Documentation  Precautions, taught by Fabienne Gallegos PT at 8/1/2024 12:25 PM.  Learner: Patient  Readiness: Acceptance  Method: Explanation  Response: Verbalizes Understanding    Mobility Training, taught by Fabienne Gallegos PT at 8/1/2024 12:25 PM.  Learner: Patient  Readiness: Acceptance  Method: Explanation  Response: Verbalizes Understanding    Education Comments  No comments found.

## 2024-08-01 NOTE — ED PROVIDER NOTES
EMERGENCY DEPARTMENT ENCOUNTER      Pt Name: Elana Farr  MRN: [id number]  Birthdate 1960  Date of evaluation: 7/31/2024  Provider: Santo Lee DO    CHIEF COMPLAINT       Chief Complaint   Patient presents with    Syncope       HISTORY OF PRESENT ILLNESS    Elana Farr is a 64 y.o. female who presents to the emergency department with EMS for syncopal episode.  Patient states that she was sitting for prolonged period of time outdoors in the heat.  She went to stand up began walking and felt as if she was going to pass out subsequently syncopized.  Her roommate was with her reported that she was out for approximately 30 seconds there was no reported seizure-like activity.  She immediately came to.  Her only complaints at this time are bilateral ankle pain for which she fell on them.  She denies any close head injury she landed on her forearm protecting her head.  She currently feels well asymptomatic.  She has been in her usual state of health prior to the incident as well.          Nursing Notes were reviewed.    REVIEW OF SYSTEMS     CONSTITUTIONAL: Denies fever, sweats, chills.   NEURO: Denies difficulty walking, numbness, weakness, tingling, headache.   HEENT: Denies sore throat, rhinorrhea, changes in vision.   CARDIO: Endorses syncope.  Denies chest pain, palpitations.  PULM: Denies shortness of breath, cough.   GI: Denies abdominal pain, nausea, vomiting, diarrhea, constipation, melena, hematochezia.  : Denies painful urination, frequency, hematuria.   MSK: Endorses ankle pain.  SKIN: Denies rash, lesions.   ENDOCRINE: Denies unexpected weight-loss.   HEME: Denies bleeding disorder.     PAST MEDICAL HISTORY   No past medical history on file.  CAD, emphysema, hypertension  SURGICAL HISTORY     No past surgical history on file.    ALLERGIES     Patient has no known allergies.    FAMILY HISTORY     No family history on file.     SOCIAL HISTORY       Social History     Socioeconomic History     Marital status:      Social Determinants of Health     Financial Resource Strain: Low Risk  (5/8/2023)    Received from [employer]    Overall Financial Resource Strain (CARDIA)     Difficulty of Paying Living Expenses: Not very hard   Food Insecurity: No Food Insecurity (5/8/2023)    Received from [employer]    Hunger Vital Sign     Worried About Running Out of Food in the Last Year: Never true     Ran Out of Food in the Last Year: Never true   Transportation Needs: Unmet Transportation Needs (5/8/2023)    Received from [employer]    PRAPARE - Transportation     Lack of Transportation (Medical): No     Lack of Transportation (Non-Medical): Yes   Physical Activity: Inactive (5/8/2023)    Received from [employer]    Exercise Vital Sign     Days of Exercise per Week: 0 days     Minutes of Exercise per Session: 0 min   Stress: Stress Concern Present (5/8/2023)    Received from [employer]    [employer] [place] [employer] - Occupational Stress Questionnaire     Feeling of Stress : Very much   Social Connections: Unknown (5/8/2023)    Received from [employer]    Social Connection and Isolation Panel [NHANES]     Frequency of Communication with Friends and Family: Once a week     Frequency of Social Gatherings with Friends and Family: Patient declined     Attends Faith Services: Never     Active Member of Clubs or Organizations: No     Attends Club or Organization Meetings: Never     Marital Status:    Intimate Partner Violence: Not At Risk (5/8/2023)    Received from [employer]    Humiliation, Afraid, Rape, and Kick questionnaire     Fear of Current or Ex-Partner: No     Emotionally Abused: No     Physically Abused: No     Sexually Abused: No       PHYSICAL EXAM   VS: As documented in the triage note from today's date and EMR flowsheet were reviewed.  Gen: Well developed. No acute distress. Seated in bed. Appears nontoxic.  GCS 15  Skin: Warm. Dry. Intact. No rashes or lesions.  Eyes:  Pupils equally round and reactive to light. Clear sclera. EOMI.  HENT: Atraumatic appearance. Mucosal membranes moist. No oral lesions, uvula midline, airway patent.  TMs collaterally nares Ayaz no midline cervical tenderness or step-offs no evidence of basilar skull fracture trachea is midline.  CV: Regular rate and regular rhythm. S1, S2. No pedal edema. Warm extremities.  Resp: Nonlabored breathing Clear to auscultation bilaterally. No increased work of breathing.   GI: Soft and nontender. No rebound or guarding. Bowel sounds x4 present.  Cruz sign McBurney's point tenderness is negative no CVA tenderness.  MSK: Symmetric muscle bulk. No joint swelling in the extremities. Compartments are soft. Neurovascularly intact x4 extremities. Radial pulses +2 equal bilaterally.  Pedal pulses +2 equal bilaterally pelvis is stable no T or L-spine tenderness or step-offs.  There is point tenderness over the right lateral malleolus as well as pain at the distal portion of the fibula tibia left-sided Achilles intact bilaterally as well as no fibular head tenderness no base of the fifth metatarsal tenderness no navicular pain.  Neuro: Alert. CN II - XII intact. Speech fluent. Moving all extremities. No focal deficits. Gait normal.  NIH 0  Psych: Appropriate. [place].    DIAGNOSTIC RESULTS   RADIOLOGY:   Non-plain film images such as CT, Ultrasound and MRI are read by the radiologist. Plain radiographic images are visualized and preliminarily interpreted by the emergency physician with the below findings: X-ray imaging of the chest shows no evidence of widened mediastinum or cardiomegaly.      Interpretation per the Radiologist below, if available at the time of this note:    XR chest 1 view   Final Result   No acute pulmonary abnormality.   Signed by Darrian Delacruz MD      XR ankle right 3+ views   Final Result   No acute fracture.   Signed by Darrian Delacruz MD      XR tibia fibula left 2 views   Final Result   No fracture.    Signed by Darrian Delacruz MD      CT head wo IV contrast   Final Result   1. No acute intracranial hemorrhage or mass effect.   2. No acute fracture or traumatic malalignment of the cervical spine.        Signed by: Blayne Mueller 7/31/2024 10:45 PM   Dictation workstation:   [id number]      CT cervical spine wo IV contrast   Final Result   1. No acute intracranial hemorrhage or mass effect.   2. No acute fracture or traumatic malalignment of the cervical spine.        Signed by: Blayne Mueller 7/31/2024 10:45 PM   Dictation workstation:   [id number]            ED BEDSIDE ULTRASOUND:   Performed by ED Physician - none    LABS:  Labs Reviewed   CBC WITH AUTO DIFFERENTIAL - Abnormal       Result Value    WBC 15.5 (*)     nRBC 0.0      RBC 4.35      Hemoglobin 14.3      Hematocrit 41.7      MCV 96      MCH 32.9      MCHC 34.3      RDW 12.9      Platelets 219      Neutrophils % 42.9      Immature Granulocytes %, Automated 0.3      Lymphocytes % 48.6      Monocytes % 6.4      Eosinophils % 1.2      Basophils % 0.6      Neutrophils Absolute 6.64      Immature Granulocytes Absolute, Automated 0.04      Lymphocytes Absolute 7.52 (*)     Monocytes Absolute 0.99      Eosinophils Absolute 0.19      Basophils Absolute 0.10     COMPREHENSIVE METABOLIC PANEL - Abnormal    Glucose 83      Sodium 133 (*)     Potassium 4.1      Chloride 100      Bicarbonate 21      Anion Gap 16      Urea Nitrogen 25 (*)     Creatinine 1.02      eGFR 61      Calcium 8.8      Albumin 4.1      Alkaline Phosphatase 61      Total Protein 6.7      AST 21      Bilirubin, Total 0.3      ALT 8     CBC - Abnormal    WBC 14.0 (*)     nRBC 0.0      RBC 4.13      Hemoglobin 13.3      Hematocrit 39.8      MCV 96      MCH 32.2      MCHC 33.4      RDW 13.0      Platelets 206     BASIC METABOLIC PANEL - Abnormal    Glucose 95      Sodium 138      Potassium 3.1 (*)     Chloride 103      Bicarbonate 27      Anion Gap 11      Urea Nitrogen 24 (*)      Creatinine 0.81      eGFR 80      Calcium 8.6     MAGNESIUM - Normal    Magnesium 2.23     SERIAL TROPONIN-INITIAL - Normal    Troponin I, High Sensitivity 6      Narrative:     Less than 99th percentile of normal range cutoff-  Female and children under 18 years old <14 ng/L; Male <21 ng/L: Negative  Repeat testing should be performed if clinically indicated.     Female and children under 18 years old 14-50 ng/L; Male 21-50 ng/L:  Consistent with possible cardiac damage and possible increased clinical   risk. Serial measurements may help to assess extent of myocardial damage.     >50 ng/L: Consistent with cardiac damage, increased clinical risk and  myocardial infarction. Serial measurements may help assess extent of   myocardial damage.      NOTE: Children less than 1 year old may have higher baseline troponin   levels and results should be interpreted in conjunction with the overall   clinical context.     NOTE: Troponin I testing is performed using a different   testing methodology at [facility] than at other   [facility]. Direct result comparisons should only   be made within the same method.   SERIAL TROPONIN, 1 HOUR - Normal    Troponin I, High Sensitivity 5      Narrative:     Less than 99th percentile of normal range cutoff-  Female and children under 18 years old <14 ng/L; Male <21 ng/L: Negative  Repeat testing should be performed if clinically indicated.     Female and children under 18 years old 14-50 ng/L; Male 21-50 ng/L:  Consistent with possible cardiac damage and possible increased clinical   risk. Serial measurements may help to assess extent of myocardial damage.     >50 ng/L: Consistent with cardiac damage, increased clinical risk and  myocardial infarction. Serial measurements may help assess extent of   myocardial damage.      NOTE: Children less than 1 year old may have higher baseline troponin   levels and results should be interpreted in conjunction with the overall  "  clinical context.     NOTE: Troponin I testing is performed using a different   testing methodology at [facility] than at other   [facility]. Direct result comparisons should only   be made within the same method.   CREATINE KINASE - Normal    Creatine Kinase 47     C-REACTIVE PROTEIN - Normal    C-Reactive Protein 0.23     TROPONIN SERIES- (INITIAL, 1 HR)    Narrative:     The following orders were created for panel order Troponin I Series, High Sensitivity (0, 1 HR).  Procedure                               Abnormality         Status                     ---------                               -----------         ------                     Troponin I, High Sensiti...[[id number]]  Normal              Final result               Troponin, High Sensitivi...[[id number]]  Normal              Final result                 Please view results for these tests on the individual orders.       All other labs were within normal range or not returned as of this dictation.    EMERGENCY DEPARTMENT COURSE/MDM:   Vitals:    Vitals:    07/31/24 2134 08/01/24 0059 08/01/24 0545   BP: 107/53 104/51 137/62   BP Location: Right arm Right arm Right arm   Patient Position: Lying Sitting Sitting   Pulse: (!) 48 52 52   Resp: 18 18 18   Temp: 36.4 °C (97.5 °F)     TempSrc: Temporal     SpO2: 95% 97% 95%   Weight: 105 kg (232 lb)     Height: 1.676 m (5' 6\")         I reviewed the patient's triage vitals and they are slightly bradycardic otherwise within normal range no evidence of block.    Due to the above findings the following was ordered cardiac workup including CT imaging of the head and neck.    Patient arrives overall well-appearing workup was pursued she does have a mild leukocytosis no infectious etiology on clinical examination we will continue to monitor.  No signs of anemia either.  No significant electrolyte derangements renal function is appropriate.  Cardiac troponin x 2 within normal range no acute injury " pattern or runs of dysrhythmias while in the emergency department.  Her Swedish syncope score is low.  I do believe this is likely vasovagal from being in heat and standing up quickly.  CT imaging of the head and cervical spine shows no evidence of fracture or intracranial bleed.  X-ray imaging of the right ankle as well as left tib-fib there is no evidence of fracture either.  Patient is not ambulatory at this time due to pain in her right ankle.  She is not a safe discharge home.  Have a thorough discussion with the patient she is agreeable with hospital admission for potential rehab.  Spoke with the admitting hospitalist who is agreed to accept the patient they did take over care at time of admission order.    ED Course as of 08/01/24 1240   Wed Jul 31, 2024 2135 Swedish syncope score -1 low risk [MG]   2135 EMS EKG interpreted by the Emergency Department Attending: ECG revealed sinus bradycardia at a rate of 50 beats per minute with DE interval 160 , QRS of 96 , QTc of 473.  No acute injury pattern.  [MG]   2135 Interpreted by the Emergency Department Attending: ECG revealed sinus bradycardia at a rate of 49 beats per minute with DE interval 160 , QRS of 90 , QTc of 431.  No acute injury pattern.  No previous EKG to compare. [MG]      ED Course User Index  [MG] Santo Lee,          Diagnoses as of 08/01/24 1240   Syncope, unspecified syncope type   Sprain of right ankle, unspecified ligament, initial encounter       Patient was counseled regarding labs, imaging, likely diagnosis, and plan. All questions were answered.     ------------------------------------------------------------------  Information provided by patient and EMS  Consults hospitalist  Due to social and economic determinants elderly lives home alone  Past medical history complicating workup CAD  Previous medical records reviewed office visit 6/21/2024  Considered CTA of the chest although not indicated at this time low concern for  vascular pathology.  Shared medical decision making patient feels that she cannot ambulate not safe discharge home.  ------------------------------------------------------------------  ED Medications administered this visit:    Medications   fluticasone furoate-vilanteroL (Breo Ellipta) 100-25 mcg/dose inhaler 1 puff (1 puff inhalation Given 8/1/24 0758)   busPIRone (Buspar) tablet 30 mg (has no administration in time range)   calcium carbonate-vitamin D3 500 mg-5 mcg (200 unit) per tablet 0.5 tablet (0.5 tablets oral Given 8/1/24 1059)   cyanocobalamin (Vitamin B-12) tablet 1,000 mcg (1,000 mcg oral Given 8/1/24 1059)   iron polysaccharide-B12-folic acid (Niferex 150 Forte) 150-25-1 mg-mcg-mg capsule 1 capsule (has no administration in time range)   FLUoxetine (PROzac) capsule 40 mg (40 mg oral Given 8/1/24 1059)   fluticasone (Flonase) nasal spray 1 spray (has no administration in time range)   hydroCHLOROthiazide (HYDRODiuril) tablet 25 mg ( oral Dose Auto Held 8/5/24 0900)   hydrOXYzine pamoate (Vistaril) capsule 25 mg (25 mg oral Given 8/1/24 0931)   cetirizine (ZyrTEC) tablet 10 mg (10 mg oral Given 8/1/24 0931)   losartan (Cozaar) tablet 25 mg ( oral Dose Auto Held 8/5/24 0900)   rosuvastatin (Crestor) tablet 20 mg (20 mg oral Given 8/1/24 1059)   zonisamide (Zonegran) capsule 300 mg (has no administration in time range)   enoxaparin (Lovenox) syringe 40 mg (40 mg subcutaneous Given 8/1/24 0931)   pantoprazole (ProtoNix) EC tablet 40 mg ( oral See Alternative 8/1/24 1059)     Or   pantoprazole (ProtoNix) injection 40 mg (40 mg intravenous Given 8/1/24 1059)   acetaminophen (Tylenol) tablet 650 mg (650 mg oral Given 8/1/24 0931)     Or   acetaminophen (Tylenol) oral liquid 650 mg ( nasogastric tube See Alternative 8/1/24 0931)     Or   acetaminophen (Tylenol) suppository 650 mg ( rectal See Alternative 8/1/24 0931)   sennosides-docusate sodium (Katerin-Colace) 8.6-50 mg per tablet 2 tablet (2 tablets oral Not  Given 8/1/24 1200)   morphine injection 4 mg (has no administration in time range)   oxyCODONE (Roxicodone) immediate release tablet 5 mg (5 mg oral Given 8/1/24 0533)   ipratropium-albuteroL (Duo-Neb) 0.5-2.5 mg/3 mL nebulizer solution 3 mL (has no administration in time range)   lamoTRIgine (LaMICtal) tablet 250 mg (250 mg oral Given 8/1/24 1059)   pregabalin (Lyrica) capsule 150 mg (150 mg oral Given 8/1/24 1106)   oxyCODONE (Roxicodone) immediate release tablet 5 mg (5 mg oral Given 7/31/24 2338)   potassium chloride CR (Klor-Con M20) ER tablet 20 mEq (20 mEq oral Given 8/1/24 1059)        Final Impression:   1. Syncope, unspecified syncope type    2. Sprain of right ankle, unspecified ligament, initial encounter    3. Abnormal electrocardiogram (ECG) (EKG)    4. Bradycardia, unspecified          Santo Lee DO    (Please note that portions of this note were completed with a voice recognition program.  Efforts were made to edit the dictations but occasionally words are mis-transcribed.)     Santo Lee DO  08/01/24 3193

## 2024-08-01 NOTE — PROGRESS NOTES
Pharmacy Medication History Review    Elana Farr is a 64 y.o. female admitted for Syncope, unspecified syncope type. Pharmacy reviewed the patient's ovyjl-et-mhbucurby medications and allergies for accuracy.    The list below reflectives the updated PTA list. Please review each medication in order reconciliation for additional clarification and justification.  Prior to Admission medications    Medication Sig Start Date End Date Authorizing Provider   aspirin 81 mg EC tablet Take 1 tablet (81 mg) by mouth once daily.   Historical Provider, MD   hydrOXYzine HCL (Atarax) 25 mg tablet Take 1 tablet (25 mg) by mouth 2 times a day as needed for anxiety.   Historical Provider, MD   lamoTRIgine (LaMICtal) 100 mg tablet Take 2.5 tablets (250 mg) by mouth twice a day.   Historical Provider, MD   tiotropium (Spiriva) 18 mcg inhalation capsule Place 1 capsule (18 mcg) into inhaler and inhale once daily.   Historical Provider, MD   albuterol 90 mcg/actuation inhaler Inhale 2 puffs every 4 hours if needed for wheezing.   Historical Provider, MD   budesonide-formoteroL (Symbicort) 80-4.5 mcg/actuation inhaler Inhale 2 puffs 2 times a day. Rinse mouth with water after use to reduce aftertaste and incidence of candidiasis. Do not swallow.   Historical Provider, MD   busPIRone (Buspar) 30 mg tablet Take 1 tablet (30 mg) by mouth 2 times a day.   Historical Provider, MD   calcium carbonate-vitamin D3 (Oyster Shell) 250 mg-3.125 mcg (125 unit) tablet Take 1 tablet by mouth 2 times daily (morning and late afternoon).   Historical Provider, MD   cyanocobalamin (Vitamin B-12) 1,000 mcg tablet Take 1 tablet (1,000 mcg) by mouth once daily.   Historical Provider, MD   ferrous sulfate 325 (65 Fe) MG EC tablet Take 65 mg by mouth once a day on Monday, Wednesday, and Friday. Do not crush, chew, or split.   Historical Provider, MD   FLUoxetine (PROzac) 20 mg capsule Take 2 capsules (40 mg) by mouth once daily.   Historical Provider, MD    hydroCHLOROthiazide (HYDRODiuril) 25 mg tablet Take 1 tablet (25 mg) by mouth once daily.   Historical Provider, MD   loratadine (Claritin) 10 mg tablet Take 1 tablet (10 mg) by mouth once daily.   Historical Provider, MD   losartan (Cozaar) 25 mg tablet Take 1 tablet (25 mg) by mouth once daily.   Historical Provider, MD   omeprazole (PriLOSEC) 20 mg DR capsule Take 1 capsule (20 mg) by mouth once daily in the morning. Take before meals. Do not crush or chew.   Historical Provider, MD   pregabalin (Lyrica) 150 mg capsule Take 2 capsules (300 mg) by mouth once daily in the evening.   Historical Provider, MD   pregabalin (Lyrica) 150 mg capsule Take 1 capsule (150 mg) by mouth once daily in the morning.   Historical Provider, MD   rosuvastatin (Crestor) 20 mg tablet Take 1 tablet (20 mg) by mouth once daily.   Historical Provider, MD   zonisamide (Zonegran) 100 mg capsule Take 3 capsules (300 mg) by mouth once daily at bedtime.   Historical Provider, MD        The list below reflectives the updated allergy list. Please review each documented allergy for additional clarification and justification.  Allergies  Reviewed by Skyla Coker RN on 7/31/2024   No Known Allergies         Below are additional concerns with the patient's PTA list.      Leanne Terry

## 2024-08-01 NOTE — H&P
History Of Present Illness  Elana Farr is a 64 y.o. female with past medical history of hypertension, hyperlipidemia moderate recurrent major depression disorder, anxiety, obstructive sleep apnea, asthma, COPD, seizure disorder, who is presenting to [facility] ED with syncopal episode x 1 at home.  Patient states she was in her backyard feeding birds, unsure how long she been out there but denies being under the sun, and after getting up and heading back to the house, she experienced episode of dizziness, and shortly after blacked out.  States that her partner witnessed the event, and saw her falling to her right side with her head protecting her head, and denied any head impact.  Stated that she was not able to get up helped to stand up she was not able to bear weight on her feet, with pain on lower extremity bilaterally but worse on the left foot, with shooting pain on the lateral aspect of the foot radiating up to the leg.  Patient has a history of seizures, but denies going through seizure episode, or having any other symptoms at this time.  In the ED upon evaluation decision was made to discharge home as workup was overall unremarkable with negative imaging all pertinent labs.  However patient complained of inability to ambulate, and decision changed to admit for further management.  Ultrasound of the lower extremities was suggested to the ED attending as patient waits for the bed to regular nursing floor.     Past Medical History  No past medical history on file.    Surgical History  No past surgical history on file.     Social History  She has no history on file for tobacco use, alcohol use, and drug use.    Family History  No family history on file.     Allergies  Patient has no known allergies.    Review of Systems   A 10 pt ROS was conducted with patient admitting to symptoms as described in HPI above, and denied having any other symptoms at this time.  Physical Exam   Constitutional:  "Pleasant, obese, alert active, cooperative not in acute distress  Eyes: PERRLA, clear sclera  ENMT: Moist mucosal membranes, no exudate  Head / Neck: Atraumatic, normocephalic, supple neck, JVP not visualized  Lungs: Patent airways, CTABL  Heart: RRR, S1S2, no murmurs appreciated, palpable pulses in all extremities  GI: Soft, NT, ND, bowel sounds present in all quadrants  MSK: Moves upper extremities freely, Limited range of motion in lower extremities, left worse than the right, no joint edema  Extremities: Unable to lift both extremities individually, left lower extremity tender to palpation on gastrocnemius, right foot with prosthetic support, no peripheral edema  : No Lemons catheter inserted  Breast: Deferred  Neurological: AAO x 3 to person, place and date, facial muscles symmetrical, sensation intact, strength 4/4, no acute focal neurological deficits appreciated  Psychological: Appropriate mood and behavior    Last Recorded Vitals  Blood pressure 104/51, pulse 52, temperature 36.4 °C (97.5 °F), temperature source Temporal, resp. rate 18, height 1.676 m (5' 6\"), weight 105 kg (232 lb), SpO2 97%.    Relevant Results      Scheduled medications  busPIRone, 30 mg, oral, BID  calcium carbonate-vitamin D3, 0.5 tablet, oral, BID  cetirizine, 10 mg, oral, Daily  cyanocobalamin, 1,000 mcg, oral, Daily  enoxaparin, 40 mg, subcutaneous, q24h  ferrous fumarate-iron ps cmplx, 1 capsule, oral, Daily with breakfast  FLUoxetine, 40 mg, oral, Daily  fluticasone, 1 spray, Each Nostril, Daily  fluticasone furoate-vilanteroL, 1 puff, inhalation, Daily  hydroCHLOROthiazide, 25 mg, oral, Daily  lamoTRIgine, 250 mg, oral, BID  losartan, 25 mg, oral, Daily  pantoprazole, 40 mg, oral, Daily   Or  pantoprazole, 40 mg, intravenous, Daily  pregabalin, 150 mg, oral, BID  rosuvastatin, 20 mg, oral, Daily  sennosides-docusate sodium, 2 tablet, oral, BID  zonisamide, 300 mg, oral, Nightly      Continuous medications     PRN " medications  PRN medications: acetaminophen **OR** acetaminophen **OR** acetaminophen, hydrOXYzine pamoate, ipratropium-albuteroL, morphine, oxyCODONE       Assessment/Plan     64 y.o. female with past medical history of hypertension, hyperlipidemia moderate recurrent major depression disorder, anxiety, obstructive sleep apnea, asthma, COPD, seizure disorder, who is presenting to [facility] ED with syncopal episode x 1 at home    Principal Problem:    Syncope, unspecified syncope type    Syncope with collapse: Unclear etiology, orthostatic, vasovagal  -Imaging and evaluation negative for trauma, ED with plan discharge, then decided to admit due to inability to ambulate  -History of seizures, denies experiencing a seizure episode, no postictal signs noted  -Complaining of excruciating pain in the lower extremities, ultrasound ordered in the ED  -CPK level normal  -Pain control with morphine 4 mg IV push every 4 hours for severe pain, Oxy IR 5 mg p.o. every 6 hours as needed moderate pain  -PT OT ordered    Hypertension  -Hydrochlorothiazide 25 mg daily, losartan 50 mg daily    Major depression disorder  -Continue home regimen    Seizure disorder  -Syncopal episode not associated with sign of postictal syndrome  -Continue home regimen    Diet  -Regular    DVT prophylaxis  -Lovenox 40 mg subcu daily       Disposition: Presenting with complaint of syncopal episode, now with difficulty ambulating, discharge pending clinical improvement.    I spent 55 minutes in the professional and overall care of this patient.      Darion Daniel DO

## 2024-08-01 NOTE — DISCHARGE INSTRUCTIONS
Should you been experiencing any new or worsening symptoms feeling as if going to pass out chest pain shortness of breath but not limited to call 911 or return immediately.

## 2024-08-01 NOTE — ED TRIAGE NOTES
PT arrives via EMS from home with c/o a syncopal episode in her driveway today. PT states prior to her syncopal episode she felt dizzy.  Pts roommate told the pt that she was out for about 30 seconds. PT c/o BL leg pain.

## 2024-08-01 NOTE — PROGRESS NOTES
"Occupational Therapy    Evaluation    Patient Name: Elana Farr  MRN: [id number]  Today's Date: 8/1/2024  Time Calculation  Start Time: 1039  Stop Time: 1053  Time Calculation (min): 14 min        Assessment:  End of Session Communication: Bedside nurse  End of Session Patient Position: Bed, 2 rail up, On cart, Alarm off, not on at start of session (All needs in reach, RN present)     Plan:  Treatment Interventions: ADL retraining, Functional transfer training, UE strengthening/ROM, Endurance training, Compensatory technique education  OT Frequency: 3 times per week  OT Discharge Recommendations: Moderate intensity level of continued care  OT - OK to Discharge: Yes (to next level of care when cleared by medical team)  Treatment Interventions: ADL retraining, Functional transfer training, UE strengthening/ROM, Endurance training, Compensatory technique education    Subjective   Current Problem:  1. Syncope, unspecified syncope type  Transthoracic Echo (TTE) Complete    Transthoracic Echo (TTE) Complete      2. Sprain of right ankle, unspecified ligament, initial encounter  oxyCODONE (Roxicodone) 5 mg immediate release tablet      3. Abnormal electrocardiogram (ECG) (EKG)  Transthoracic Echo (TTE) Complete      4. Bradycardia, unspecified  Transthoracic Echo (TTE) Complete        General:  General  Reason for Referral: impaired adl; pt. admitted with syncope in her driveway, dizziness and \"out\" x 30 seconds, then shooting pain LLE, xrays R ankle/tib/fib:  (-), LLE duplex:  (-) dvt thigh, single calf vein visualized and patent  Referred By: Fredy  Past Medical History Relevant to Rehab: anxiety, copd, htn, hld, major depressive d/o, [place], asthma, seizure disorder  Prior to Session Communication: Bedside nurse  Patient Position Received: Bed, 2 rail up, Alarm off, not on at start of session  General Comment: pt. agreeable to therapy intervention  Precautions:  Precautions Comment: falls, history of seizures, " "telemetry, air cast R ankle  Vital Signs:  Heart Rate:  (VSS)  Pain:  Pain Assessment  Pain Assessment:  (Pt reports tolerable pain at rest, however states B LE pain is \"25/10\" with activity. Pt states the L ankle pain radiates up through her calf and the R ankle is more localized pain.  pt. had been pre medicated for pain)    Objective   Cognition:  Overall Cognitive Status: Within Functional Limits           Home Living:  Home Living Comments: pt. lives wit partner, 2 floor home, 4 entry steps with rail, 1st floor set up with laundry in the basement, rail on the steps.  tub/shower with transfer bench, grab bar, hhs, st. height toilet.  pt's partner has ms, canes, walkers and rollator available  Prior Function:  Prior Function Comments: independent with adl/shares iadl tasks, does not drive, partner does with hand controls  IADL History:     ADL:  ADL Comments: would estimate dependent assist for LB adl/toileting due to pain complaints, UB adl likely completed with min assist x 1  Activity Tolerance:     Bed Mobility/Transfers: Bed Mobility  Bed Mobility:  (min assist x 1 supine to sit, mod assist x 2 sit to supine due to pain BLE's)    Transfers  Transfer:  (sit<> stand from ED cart:  min assist x 2)      Functional Mobility:  Functional Mobility  Functional Mobility Performed:  (pt. able to sidestep minimally via wh. walker, mod assist x 2, difficulty weight shifting/advancing feet due to pain, heavy reliance on walker with bue's, cues for deep breathing as pt. retaining breath, having some lightheadedness)  Sensation:  Sensation Comment: sensation intact  Strength:  Strength Comments: bue's at least 3/5    Outcome Measures:[facility] Daily Activity  Putting on and taking off regular lower body clothing: Total  Bathing (including washing, rinsing, drying): Total  Putting on and taking off regular upper body clothing: A little  Toileting, which includes using toilet, bedpan or urinal: A lot  Taking care of personal " grooming such as brushing teeth: None  Eating Meals: None  Daily Activity - Total Score: 15        Education Documentation  ADL Training, taught by Leanne Easley OT at 8/1/2024  1:17 PM.  Learner: Patient  Readiness: Acceptance  Method: Explanation  Response: Verbalizes Understanding, Needs Reinforcement         Goals:  Encounter Problems       Encounter Problems (Active)       OT Goals       Increase functional mobility and  functional transfers to supervision for bed/chair/toilet with dme prn   (Progressing)       Start:  08/01/24    Expected End:  08/15/24            increase bue ther ex/activity x 7-10 minutes and increase standing tolerance x 3-5 minutes  with supervision to promote greater activity tolerance for assist with adl.   (Progressing)       Start:  08/01/24    Expected End:  08/15/24            Increase lb dressing/bathing to supervision with dme prn  (Progressing)       Start:  08/01/24    Expected End:  08/15/24            Increase toileting to supervision with dme prn  (Progressing)       Start:  08/01/24    Expected End:  08/15/24            Pt. To adhere to walker safety techniques with supervision for assist/safety with transfers/adl/mobility with dme prn  (Progressing)       Start:  08/01/24    Expected End:  08/15/24

## 2024-08-01 NOTE — CONSULTS
Cardiology Consult    Impression:  Syncope.  Sounds orthostatic.  Hypertension  Hyperlipidemia  WILBER  COPD  Seizure disorder  History of depression  Plan:  Check orthostatic vital  Replace potassium  Would favor holding HCTZ and resuming losartan  Will review echo  I am okay with discharge  CC  Syncope  HPI:  64-year-old woman brought to hospital after she fainted.  She was sitting in a chair outside feeding the 5by.  She got up to come back inside.  After walking 10 to 15 feet she felt dizzy, lightheaded and fainted.  She injured her feet and was unable to stand on her own.  She was therefore brought to hospital.  EKG showed sinus bradycardia.  Blood pressure 120/80.  No chest pain.  No shortness of breath.  No palpitations.  Does not recall fainting previously.  She tells me her PCP recently cut back on her blood pressure meds.  No recent illnesses.  Meds:  Scheduled medications  busPIRone, 30 mg, oral, BID  calcium carbonate-vitamin D3, 0.5 tablet, oral, BID  cetirizine, 10 mg, oral, Daily  cyanocobalamin, 1,000 mcg, oral, Daily  enoxaparin, 40 mg, subcutaneous, q24h  FLUoxetine, 40 mg, oral, Daily  fluticasone, 1 spray, Each Nostril, Daily  fluticasone furoate-vilanteroL, 1 puff, inhalation, Daily  [Held by provider] hydroCHLOROthiazide, 25 mg, oral, Daily  iron polysaccharide-B12-folic acid, 1 capsule, oral, Daily with breakfast  lamoTRIgine, 250 mg, oral, BID  [Held by provider] losartan, 25 mg, oral, Daily  pantoprazole, 40 mg, oral, Daily   Or  pantoprazole, 40 mg, intravenous, Daily  potassium chloride CR, 20 mEq, oral, Once  pregabalin, 150 mg, oral, BID  rosuvastatin, 20 mg, oral, Daily  sennosides-docusate sodium, 2 tablet, oral, BID  zonisamide, 300 mg, oral, Nightly      Continuous medications     PRN medications  PRN medications: acetaminophen **OR** acetaminophen **OR** acetaminophen, hydrOXYzine pamoate, ipratropium-albuteroL, morphine, oxyCODONE    PMHx:  As listed above  Social  history:  Lives in the community.  No cigarettes or alcohol.  Family history:  Noncontributory  Review of systems:  See HPI  Physical exam:  Vitals:    08/01/24 0545   BP: 137/62   Pulse: 52   Resp: 18   Temp:    SpO2: 95%      JVP not elevated. Carotid upstroke normal. No carotid bruits. Heart sounds normal. No extra sounds or murmurs. Chest clear. Abdomen soft, nontender. No masses. Peripheral pulses palpable. No edema.  EKG:  Sinus bradycardia  Echo:  No results found for this or any previous visit.   Labs:  Lab Results   Component Value Date    WBC 14.0 (H) 08/01/2024    HGB 13.3 08/01/2024    HCT 39.8 08/01/2024     08/01/2024    ALT 8 07/31/2024    AST 21 07/31/2024     08/01/2024    K 3.1 (L) 08/01/2024     08/01/2024    CREATININE 0.81 08/01/2024    BUN 24 (H) 08/01/2024    CO2 27 08/01/2024    HGBA1C 5.8 (H) 04/05/2024     par

## 2024-08-02 VITALS
SYSTOLIC BLOOD PRESSURE: 133 MMHG | WEIGHT: 232 LBS | DIASTOLIC BLOOD PRESSURE: 60 MMHG | TEMPERATURE: 95.5 F | OXYGEN SATURATION: 93 % | HEIGHT: 66 IN | HEART RATE: 55 BPM | BODY MASS INDEX: 37.28 KG/M2 | RESPIRATION RATE: 16 BRPM

## 2024-08-02 PROBLEM — R55 SYNCOPE, UNSPECIFIED SYNCOPE TYPE: Status: RESOLVED | Noted: 2024-08-01 | Resolved: 2024-08-02

## 2024-08-02 PROCEDURE — 96372 THER/PROPH/DIAG INJ SC/IM: CPT | Performed by: INTERNAL MEDICINE

## 2024-08-02 PROCEDURE — 97110 THERAPEUTIC EXERCISES: CPT | Mod: GP

## 2024-08-02 PROCEDURE — 94640 AIRWAY INHALATION TREATMENT: CPT

## 2024-08-02 PROCEDURE — 2500000001 HC RX 250 WO HCPCS SELF ADMINISTERED DRUGS (ALT 637 FOR MEDICARE OP): Performed by: INTERNAL MEDICINE

## 2024-08-02 PROCEDURE — G0378 HOSPITAL OBSERVATION PER HR: HCPCS

## 2024-08-02 PROCEDURE — 97161 PT EVAL LOW COMPLEX 20 MIN: CPT | Mod: GP

## 2024-08-02 PROCEDURE — 2500000002 HC RX 250 W HCPCS SELF ADMINISTERED DRUGS (ALT 637 FOR MEDICARE OP, ALT 636 FOR OP/ED): Performed by: INTERNAL MEDICINE

## 2024-08-02 PROCEDURE — 97535 SELF CARE MNGMENT TRAINING: CPT | Mod: GO

## 2024-08-02 PROCEDURE — 2500000001 HC RX 250 WO HCPCS SELF ADMINISTERED DRUGS (ALT 637 FOR MEDICARE OP): Performed by: STUDENT IN AN ORGANIZED HEALTH CARE EDUCATION/TRAINING PROGRAM

## 2024-08-02 PROCEDURE — 2500000004 HC RX 250 GENERAL PHARMACY W/ HCPCS (ALT 636 FOR OP/ED): Performed by: INTERNAL MEDICINE

## 2024-08-02 PROCEDURE — 99239 HOSP IP/OBS DSCHRG MGMT >30: CPT | Performed by: INTERNAL MEDICINE

## 2024-08-02 RX ORDER — OXYCODONE HYDROCHLORIDE 5 MG/1
5 TABLET ORAL EVERY 6 HOURS PRN
Qty: 15 TABLET | Refills: 0 | Status: SHIPPED | OUTPATIENT
Start: 2024-08-02 | End: 2024-08-07

## 2024-08-02 ASSESSMENT — PAIN - FUNCTIONAL ASSESSMENT
PAIN_FUNCTIONAL_ASSESSMENT: 0-10

## 2024-08-02 ASSESSMENT — COGNITIVE AND FUNCTIONAL STATUS - GENERAL
DRESSING REGULAR LOWER BODY CLOTHING: A LOT
HELP NEEDED FOR BATHING: A LOT
MOBILITY SCORE: 20
STANDING UP FROM CHAIR USING ARMS: A LITTLE
DAILY ACTIVITIY SCORE: 20
WALKING IN HOSPITAL ROOM: A LITTLE
CLIMB 3 TO 5 STEPS WITH RAILING: A LOT

## 2024-08-02 ASSESSMENT — PAIN SCALES - GENERAL
PAINLEVEL_OUTOF10: 6
PAINLEVEL_OUTOF10: 3
PAINLEVEL_OUTOF10: 0 - NO PAIN

## 2024-08-02 ASSESSMENT — ACTIVITIES OF DAILY LIVING (ADL): HOME_MANAGEMENT_TIME_ENTRY: 13

## 2024-08-02 NOTE — HOSPITAL COURSE
Elana Farr is a 64 y.o. female with past medical history of hypertension, hyperlipidemia moderate recurrent major depression disorder, anxiety, obstructive sleep apnea, asthma, COPD, seizure disorder, who is presenting to [facility] ED with syncopal episode x 1 at home.  Patient states she was in her backyard feeding birds, unsure how long she been out there but denies being under the sun, and after getting up and heading back to the house, she experienced episode of dizziness, and shortly after blacked out.  States that her partner witnessed the event, and saw her falling to her right side with her head protecting her head, and denied any head impact.  Stated that she was not able to get up helped to stand up she was not able to bear weight on her feet, with pain on lower extremity bilaterally but worse on the left foot, with shooting pain on the lateral aspect of the foot radiating up to the leg.  Patient has a history of seizures, but denies going through seizure episode, or having any other symptoms at this time.  In the ED upon evaluation decision was made to discharge home as workup was overall unremarkable with negative imaging all pertinent labs.  However patient complained of inability to ambulate, and decision changed to admit for further management.  Ultrasound of the lower extremities was suggested to the ED attending as patient waits for the bed to regular nursing floor.    Cardiology consulted.   Orthostatics.   TTE ordered for eval.       Suspected dehydration leading to her syncope.   TTE unremarkable and cleared by cardiology for DC.   BP has improved and she would like to resume her medications.   She states without hydrochlorothiazide her swelling in her legs increased and cardiology preferred to keep losartan on board.  Patient will need close OP Follow up especially if any recurrence.  Improved with PT/OT.   Swelling negative for DVT by US.   Recommend elevated, ACE wrap and  ice.   Pain medication PRN.  Tolerating PO intake.

## 2024-08-02 NOTE — DISCHARGE SUMMARY
Discharge Diagnosis  Syncope, unspecified syncope type    Issues Requiring Follow-Up  PCP/Cardiology for BP check     Discharge Meds     Your medication list        START taking these medications        Instructions Last Dose Given Next Dose Due   oxyCODONE 5 mg immediate release tablet  Commonly known as: Roxicodone      Take 1 tablet (5 mg) by mouth every 8 hours if needed for severe pain (7 - 10) for up to 3 days. Do not drive or operate heavy machinery as medication is sedating and can also cause constipation.                 Where to Get Your Medications        You can get these medications from any pharmacy    Bring a paper prescription for each of these medications  oxyCODONE 5 mg immediate release tablet         Test Results Pending At Discharge  Pending Labs       No current pending labs.            Hospital Course  Elana Farr is a 64 y.o. female with past medical history of hypertension, hyperlipidemia moderate recurrent major depression disorder, anxiety, obstructive sleep apnea, asthma, COPD, seizure disorder, who is presenting to [facility] ED with syncopal episode x 1 at home.  Patient states she was in her backyard feeding birds, unsure how long she been out there but denies being under the sun, and after getting up and heading back to the house, she experienced episode of dizziness, and shortly after blacked out.  States that her partner witnessed the event, and saw her falling to her right side with her head protecting her head, and denied any head impact.  Stated that she was not able to get up helped to stand up she was not able to bear weight on her feet, with pain on lower extremity bilaterally but worse on the left foot, with shooting pain on the lateral aspect of the foot radiating up to the leg.  Patient has a history of seizures, but denies going through seizure episode, or having any other symptoms at this time.  In the ED upon evaluation decision was made to discharge home  as workup was overall unremarkable with negative imaging all pertinent labs.  However patient complained of inability to ambulate, and decision changed to admit for further management.  Ultrasound of the lower extremities was suggested to the ED attending as patient waits for the bed to regular nursing floor.    Cardiology consulted.   Orthostatics.   TTE ordered for eval.       Suspected dehydration leading to her syncope.   TTE unremarkable and cleared by cardiology for DC.   BP has improved and she would like to resume her medications.   She states without hydrochlorothiazide her swelling in her legs increased and cardiology preferred to keep losartan on board.  Patient will need close OP Follow up especially if any recurrence.  Improved with PT/OT.   Swelling negative for DVT by US.   Recommend elevated, ACE wrap and ice.   Pain medication PRN.  Tolerating PO intake.      [facility] referral made.     Discharge time spent 40 minutes.      Pertinent Physical Exam At Time of Discharge    GENERAL:   no distress, alert and cooperative  HEENT: Normal Inspection, Mucous membranes moist, No JVD, No Lymphadenopathy  CARDIOVASCULAR: RRR, no murmurs, 2+ equal pulses of the extremities, normal S1 and S 2  RESPIRATORY: Patent airways, CTAB, normal breath sounds with good chest expansion, thorax symmetric, No Wheezes, Rales or Rhonchi  ABDOMEN: Soft, Non-Tender, Normal Bowel Sounds, No Distention  SKIN: Warm and dry, no lesions, no rashes  EXTREMITIES: normal extremities, no cyanosis edema, contusions or wounds, no clubbing  NEURO: A&O x 3, CN II-XII grossly intact  PSYCH: Appropriate mood and behavior      Outpatient Follow-Up  No future appointments.      Giancarlo Ramirez,

## 2024-08-02 NOTE — PROGRESS NOTES
Occupational Therapy    Occupational Therapy Treatment    Name: Elana Farr  MRN: [id number]  : 1960  Date: 24  Time Calculation  Start Time: 1343  Stop Time: 1409  Time Calculation (min): 26 min    Assessment:  OT Assessment: Patient showing good progress toward goals: significant improvement in mobility compared to initial OT eval  Prognosis: Good  End of Session Communication: Bedside nurse  End of Session Patient Position: Bed, 2 rail up, Alarm off, not on at start of session; call-light within reach    Plan:  Treatment Interventions: ADL retraining, Functional transfer training, UE strengthening/ROM, Endurance training, Compensatory technique education  OT Frequency: 3 times per week  OT Discharge Recommendations: Low intensity level of continued care (would benefit from 24 hour support initially)  OT - OK to Discharge: Yes (to next level of care when cleared by medical team)    Subjective   Previous Visit Info:  OT Last Visit  OT Received On: 24    General:  General  Co-Treatment: PT  Co-Treatment Reason: to maximize safety during mobility tasks  Prior to Session Communication: Bedside nurse who confirmed that patient is medically stable to participate in this OT session  Patient Position Received: Bed, 2 rail up, Alarm off, not on at start of session  General Comment: Patient seen for bedside treatment; cooperative, motivated to return home    Precautions:  Medical Precautions: Fall precautions  Precautions Comment: wearing right foot/ankle aircast splint     Pain Assessment:  Pain Assessment  Pain Assessment: 0-10  0-10 (Numeric) Pain Score: 6  Pain Type: Acute pain  Pain Location: Ankle (ankles, left calf primarily: informed RN)     Objective   Cognition:  Overall Cognitive Status: Within Functional Limits    Activities of Daily Living: LE Dressing  LE Dressing: Yes  LE Dressing Adaptive Equipment: Reacher, Sock aide  LE Dressing Comments: estimate minimal/moderate assist needed;  discussed benefit/use of adaptive equipment however patient declined, stating partner to assist as needed upon return home    Toileting  Toileting Level of Assistance: Independent     Bed Mobility/Transfers: Bed Mobility  Bed Mobility: Yes  Bed Mobility 1  Bed Mobility 1: Supine to sitting, Sitting to supine  Level of Assistance 1: Independent  Bed Mobility Comments 1: HOB elevated supine to sit    Transfers  Transfer: Yes  Transfer 1  Transfer From 1: Sit to, Stand to  Transfer to 1: Bed, Commode-standard (right wall grab bar)  Transfer Device 1: Walker  Transfer Level of Assistance 1: Close supervision, Minimal verbal cues    Functional Mobility:  Functional Mobility  Functional Mobility Performed: Yes  Functional Mobility 1  Device 1: Rolling walker  Assistance 1: Minimal verbal cues (SBA)  Comments 1: positioning of walker to ambulate into bathroom per patient request    Sitting Balance:  Static Sitting Balance  Static Sitting-Level of Assistance: Independent  Standing Balance:  Static Standing Balance  Static Standing-Level of Assistance:  (SBA)  Static Standing-Comment/Number of Minutes: including to wash hands    Outcome Measures:  [facility] Daily Activity  Putting on and taking off regular lower body clothing: A lot  Bathing (including washing, rinsing, drying): A lot  Putting on and taking off regular upper body clothing: None  Toileting, which includes using toilet, bedpan or urinal: None  Taking care of personal grooming such as brushing teeth: None  Eating Meals: None  Daily Activity - Total Score: 20    Education Documentation  Education Comments  proper mobility techniques to promote safety     Goals:  Encounter Problems       Encounter Problems (Active)       OT Goals       Increase functional mobility and  functional transfers to supervision for bed/chair/toilet with dme prn   (Progressing)       Start:  08/01/24    Expected End:  08/15/24            increase bue ther ex/activity x 7-10 minutes and  increase standing tolerance x 3-5 minutes  with supervision to promote greater activity tolerance for assist with adl.   (Progressing)       Start:  08/01/24    Expected End:  08/15/24            Increase lb dressing/bathing to supervision with dme prn  (Progressing)       Start:  08/01/24    Expected End:  08/15/24            Increase toileting to supervision with dme prn  (Progressing)       Start:  08/01/24    Expected End:  08/15/24            Pt. To adhere to walker safety techniques with supervision for assist/safety with transfers/adl/mobility with dme prn  (Progressing)       Start:  08/01/24    Expected End:  08/15/24

## 2024-08-02 NOTE — PROGRESS NOTES
- cpap q hs       Physical Therapy    Physical Therapy Treatment    Patient Name: Elana Farr  MRN: [id number]  Today's Date: 8/2/2024  Time Calculation  Start Time: 1344  Stop Time: 1410  Time Calculation (min): 26 min     328/328-A    Assessment/Plan   PT Assessment  PT Assessment Results: Decreased endurance, Impaired balance, Decreased mobility, Pain  End of Session Communication: Bedside nurse  End of Session Patient Position: Bed, 2 rail up, Alarm off, not on at start of session (call light in reach)     PT Plan  Treatment/Interventions: Bed mobility, Transfer training, Gait training  PT Plan: Ongoing PT  PT Frequency: 3 times per week  PT Discharge Recommendations: Moderate intensity level of continued care  PT - OK to Discharge: Yes    Current Problem:  Patient Active Problem List   Diagnosis   (none) - all problems resolved or deleted       General Visit Information:   PT  Visit  PT Received On: 08/02/24  Response to Previous Treatment: Patient with no complaints from previous session.  General  Co-Treatment: OT  Co-Treatment Reason: to maximize pt safety and mobility  Prior to Session Communication: Bedside nurse  Patient Position Received: Bed, 2 rail up, On cart, Alarm off, not on at start of session  Subjective     Precautions:  Precautions  Precautions Comment: Fall precautions, R ankle aircast         Objective     Pain:  Pain Assessment  0-10 (Numeric) Pain Score: 6    Cognition:  Cognition  Orientation Level: Oriented X4        Treatments:     Therapeutic Activity  Therapeutic Activity Performed:  (LAQ x10; ankle pumps x 10; hip ER x 10; hip flexion x 10)     Bed Mobility  Bed Mobility:  (IND sup<>sit)  Ambulation/Gait Training  Ambulation/Gait Training Performed:  (amb to and from bathroom with w/w and SBA)  Transfers  Transfer:  (STS from bed and from toliet supervision)          Outcome Measures:  [facility] Basic Mobility  Turning from your back to your side while in a flat bed without using bedrails: None  Moving  from lying on your back to sitting on the side of a flat bed without using bedrails: None  Moving to and from bed to chair (including a wheelchair): None  Standing up from a chair using your arms (e.g. wheelchair or bedside chair): A little  To walk in hospital room: A little  Climbing 3-5 steps with railing: A lot  Basic Mobility - Total Score: 20  Education Documentation  Mobility Training, taught by Kaylin Burk PT at 8/2/2024  3:01 PM.  Learner: Patient  Readiness: Acceptance  Method: Explanation  Response: Verbalizes Understanding    Education Comments  No comments found.        EDUCATION:     Encounter Problems       Encounter Problems (Active)       Pain - Adult             Encounter Problems (Resolved)       PT Problem       STG - Pt will transition supine <> sitting with SUP  (Met)       Start:  08/01/24    Expected End:  08/16/24    Resolved:  08/02/24         STG - Pt will transfer STS with SBA  (Met)       Start:  08/01/24    Expected End:  08/16/24    Resolved:  08/02/24         STG - Pt will amb 50' using LRD with SBA  (Met)       Start:  08/01/24    Expected End:  08/16/24    Resolved:  08/02/24

## 2024-08-02 NOTE — PROGRESS NOTES
24 1511   Discharge Planning   Living Arrangements Spouse/significant other   Support Systems Spouse/significant other   Assistance Needed None PTA   Type of Residence Private residence   Home or Post Acute Services In home services   Type of Home Care Services Home OT;Home PT   Expected Discharge Disposition HH Services   Does the patient need discharge transport arranged? No     TCC Note: Met with pt. at bedside, introduced self and role on the Transition of Care Team.  Verified name, , demographics, insurance, PCP is Dr Lujan at [facility] and Emergency contact is Partner, Mike Lu Phone: [phone] or [phone] (Cell). Pt. lives with partner and has a first floor bed/bath. Pt. Was previously  Independent with ADLs. Pt. Had a  recent fall about 2-3 weeks ago and does not use an assistive device for ambulation. Pt. is not diabetic and does not use any home O2 or any other home services/supplies. Preferred pharmacy is [facility] on [address]. No problems affording or obtaining medications. Pt. states that she is agreeable to HHC as she is declining SNF, which therapy recommended. Provided list of HHC agencies per [facility] Directory, which includes agencies that are within  [facility] Quality network as well as meeting patient's medical needs and are in-network for patient's insurance while also in discharge geographic are patient/family prefers. List identifies each facilities CMS star rating. Patient/family to review SNF list and provide choices for HHC preference. Will obtain pt's 3 choices and place referrals. Transitions of Care will continue to follow until discharge. Giulia Cheung, MSN, RN, TCC.    ADDENDUM: Requested MD to please put in External HHC orders because [facility]. Pt's choices in order of preference are: [facility], [facility] and [facility]. Referrals sent. Giulia Cheung, JOY, RN, TCC.    ADDENDUM: Pt aware [facility] will call  her to start care. Pt is discharging home today. Giulia Cheung, MSN, RN, TCC.

## 2024-08-02 NOTE — PROGRESS NOTES
Cardiology Progress    Impression:  Syncope.  Sounds orthostatic.  No arrhythmia or structural heart disease.  Hypertension  Hyperlipidemia  WILBER  COPD  Seizure disorder  History of depression  Plan:  Okay for discharge from my perspective.  Follow-up PCP.  HPI:  No problems overnight.  Echo shows normal EF.  Telemetry shows sinus bradycardia without pauses or heart block.  Meds:  Scheduled medications  busPIRone, 30 mg, oral, BID  calcium carbonate-vitamin D3, 0.5 tablet, oral, BID  cetirizine, 10 mg, oral, Daily  cyanocobalamin, 1,000 mcg, oral, Daily  enoxaparin, 40 mg, subcutaneous, q24h  FLUoxetine, 40 mg, oral, Daily  fluticasone, 1 spray, Each Nostril, Daily  fluticasone furoate-vilanteroL, 1 puff, inhalation, Daily  [Held by provider] hydroCHLOROthiazide, 25 mg, oral, Daily  iron polysaccharide-B12-folic acid, 1 capsule, oral, Daily with breakfast  lamoTRIgine, 250 mg, oral, BID  losartan, 25 mg, oral, Daily  pantoprazole, 40 mg, oral, Daily   Or  pantoprazole, 40 mg, intravenous, Daily  pregabalin, 150 mg, oral, BID  rosuvastatin, 20 mg, oral, Daily  sennosides-docusate sodium, 2 tablet, oral, BID  zonisamide, 300 mg, oral, Nightly      Continuous medications     PRN medications  PRN medications: acetaminophen **OR** acetaminophen **OR** acetaminophen, hydrOXYzine pamoate, ipratropium-albuteroL, morphine, oxyCODONE    Physical exam:  Vitals:    08/02/24 0737   BP:    Pulse:    Resp:    Temp:    SpO2: 97%      No JVD.  Chest clear.  No edema.  EKG:  Normal sinus rhythm  Echo:  Normal EF.  Labs:  Lab Results   Component Value Date    WBC 14.0 (H) 08/01/2024    HGB 13.3 08/01/2024    HCT 39.8 08/01/2024     08/01/2024    ALT 8 07/31/2024    AST 21 07/31/2024     08/01/2024    K 3.1 (L) 08/01/2024     08/01/2024    CREATININE 0.81 08/01/2024    BUN 24 (H) 08/01/2024    CO2 27 08/01/2024    HGBA1C 5.8 (H) 04/05/2024     par